# Patient Record
(demographics unavailable — no encounter records)

---

## 2019-01-12 NOTE — PHYS DOC
Past History


Past Medical History:  Depression


Past Surgical History:  Appendectomy


Additional Past Surgical Histo:  liver biopsy


Smoking:  Cigarettes


Alcohol Use:  None


Drug Use:  None





Adult General


Chief Complaint


Chief Complaint:  SORE THROAT





HPI


HPI





Patient is a 23 year old male who presents with sore throat. This is been 

present for the past several days. Patient reports a fever 104 yesterday. 

Increased pain with swallowing. 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 

children have had strep pharyngitis. Patient reports his swallowing makes 

things worse. Also noted some nasal congestion. Patient has taken ibuprofen at 

845 this morning.





Review of Systems


Review of Systems





Constitutional: Denies chills []


Eyes: Denies change in visual acuity, redness, or eye pain []


HENT: See history of present illness[]


Respiratory: Denies cough or shortness of breath []


Cardiovascular: No chest pain or palpitations[]


GI: Denies abdominal pain, nausea, vomiting, bloody stools or diarrhea []


: Denies dysuria or hematuria []


Musculoskeletal: Denies back pain or joint pain []


Integument: Denies rash or skin lesions []


Neurologic: Denies headache, focal weakness or sensory changes []


Endocrine: Denies polyuria or polydipsia []





All other systems were reviewed and found to be within normal limits, except as 

documented in this note.





Physical Exam


Physical Exam





Constitutional: Well developed, well nourished, no acute distress, non-toxic 

appearance. []


HENT: Normocephalic, atraumatic, bilateral external ears normal, oropharynx 

moist, enlarged tonsils with exudates, symmetric, uvula is midline, nose 

normal. []


Eyes: PERRLA, EOMI, conjunctiva normal, no discharge. [] 


Neck: Normal range of motion, no tenderness, supple, no stridor. [] 


Cardiovascular:Heart rate is tachycardic with a regular rhythm, no murmur []


Lungs & Thorax:  Bilateral breath sounds clear to auscultation []


Abdomen: Bowel sounds normal, soft, no tenderness, no masses, no pulsatile 

masses. No hepato-or splenomegaly[] 


Skin: Warm, dry, no erythema, no rash. [] 


Back: No tenderness, no CVA tenderness. [] 


Extremities: No tenderness, no cyanosis, no clubbing, ROM intact, no edema. [] 


Neurologic: Alert and oriented X 3, normal motor function, normal sensory 

function, no focal deficits noted. []


Psychologic: Affect normal, judgement normal, mood normal. []





EKG


EKG


[]





Radiology/Procedures


Radiology/Procedures


[]





Course & Med Decision Making


Course & Med Decision Making


Pertinent Labs and Imaging studies reviewed. (See chart for details)





Medical decision making: Patient appears to have an exudative pharyngitis, no 

evidence of mononucleosis, no evidence of purulent tolerance, patient 

clinically does not appear dehydrated despite the elevated heart rate, she has 

moist mucous membranes. Do not see the need for IV fluids at this time.





[]





Dragon Disclaimer


Dragon Disclaimer


This electronic medical record was generated, in whole or in part, using a 

voice recognition dictation system.





Departure


Departure:


Impression:  


 Primary Impression:  


 Exudative pharyngitis


Disposition:  01 HOME, SELF-CARE


Condition:  GOOD


Referrals:  


PCP,NO (PCP)


Patient Instructions:  Viral and Bacterial Pharyngitis





Additional Instructions:  


Drink plenty of fluids. Follow-up with your regular doctor in 2 days. If you do 

not have regular doctor, list of local clinics will be provided for you. Return 

to the ER if worsening discomfort, unable to tolerate liquids, or any other 

concerns.





Antibiotics, like amoxicillin, which you have been prescribed, inactivate birth 

control pills. Use a second form of protection to prevent pregnancy until the 

antibiotic is finished AND you start the next package of birth control 

medication after the antibiotic is finished.


Scripts


Prednisone (PREDNISONE) 50 Mg Tablet


1 TAB PO DAILY for INFLAMMATION, #5 TAB


   Prov: CAROLA LOCK DO         1/12/19 


Amoxicillin (AMOXICILLIN) 500 Mg Tablet


1 TAB PO TID for pharyngitis, #30 TAB


   Prov: CAROLA LOCK DO         1/12/19











CAROLA LOCK DO Jan 12, 2019 12:12